# Patient Record
Sex: MALE | Race: WHITE | NOT HISPANIC OR LATINO | URBAN - METROPOLITAN AREA
[De-identification: names, ages, dates, MRNs, and addresses within clinical notes are randomized per-mention and may not be internally consistent; named-entity substitution may affect disease eponyms.]

---

## 2019-08-10 ENCOUNTER — HOSPITAL ENCOUNTER (OUTPATIENT)
Dept: URGENT CARE | Facility: CLINIC | Age: 29
Discharge: HOME | End: 2019-08-10
Attending: NURSE PRACTITIONER

## 2020-02-22 ENCOUNTER — HOSPITAL ENCOUNTER (OUTPATIENT)
Dept: URGENT CARE | Facility: CLINIC | Age: 30
Discharge: HOME | End: 2020-02-22

## 2020-02-24 LAB — BACTERIA SPEC AEROBE CULT: NORMAL

## 2025-02-06 ENCOUNTER — HOSPITAL ENCOUNTER (EMERGENCY)
Facility: HOSPITAL | Age: 35
Discharge: HOME | End: 2025-02-07
Attending: EMERGENCY MEDICINE
Payer: COMMERCIAL

## 2025-02-06 ENCOUNTER — APPOINTMENT (OUTPATIENT)
Dept: GENERAL RADIOLOGY | Facility: HOSPITAL | Age: 35
End: 2025-02-06
Payer: COMMERCIAL

## 2025-02-06 DIAGNOSIS — J10.1 INFLUENZA A: Primary | ICD-10-CM

## 2025-02-06 DIAGNOSIS — J18.9 PNEUMONIA OF BOTH LUNGS DUE TO INFECTIOUS ORGANISM, UNSPECIFIED PART OF LUNG: ICD-10-CM

## 2025-02-06 LAB
FLUAV SUBTYP SPEC NAA+PROBE: DETECTED
FLUBV RNA ISLT QL NAA+PROBE: NOT DETECTED
RSV RNA NPH QL NAA+NON-PROBE: NOT DETECTED
SARS-COV-2 RNA RESP QL NAA+PROBE: NOT DETECTED

## 2025-02-06 PROCEDURE — 99283 EMERGENCY DEPT VISIT LOW MDM: CPT

## 2025-02-06 PROCEDURE — 87637 SARSCOV2&INF A&B&RSV AMP PRB: CPT | Performed by: EMERGENCY MEDICINE

## 2025-02-06 PROCEDURE — 71045 X-RAY EXAM CHEST 1 VIEW: CPT

## 2025-02-06 RX ORDER — ACETAMINOPHEN 500 MG
1000 TABLET ORAL ONCE
Status: COMPLETED | OUTPATIENT
Start: 2025-02-06 | End: 2025-02-06

## 2025-02-06 RX ORDER — IBUPROFEN 600 MG/1
600 TABLET, FILM COATED ORAL ONCE
Status: COMPLETED | OUTPATIENT
Start: 2025-02-06 | End: 2025-02-06

## 2025-02-06 RX ADMIN — ACETAMINOPHEN 1000 MG: 500 TABLET ORAL at 23:31

## 2025-02-06 RX ADMIN — IBUPROFEN 600 MG: 600 TABLET, FILM COATED ORAL at 23:31

## 2025-02-06 NOTE — Clinical Note
[facility] EMERGENCY ROOM  [address] [place] [address]  Phone: [phone]  Fax: [phone]    Nicolas Denton was seen and treated in our emergency department on 2/6/2025.  He may return to work on 02/11/2025.         Thank you for choosing [facility].    Hillary Mayen PA-C

## 2025-02-06 NOTE — Clinical Note
[facility] EMERGENCY ROOM  [address] [place] [address]  Phone: [phone]  Fax: [phone]    Nicolas Denton was seen and treated in our emergency department on 2/6/2025.  He may return to work on 02/11/2025.         Thank you for choosing [facility].    Hillary Mayen PA-C

## 2025-02-07 VITALS
WEIGHT: 315 LBS | HEIGHT: 74 IN | HEART RATE: 129 BPM | BODY MASS INDEX: 40.43 KG/M2 | OXYGEN SATURATION: 92 % | RESPIRATION RATE: 20 BRPM | DIASTOLIC BLOOD PRESSURE: 54 MMHG | SYSTOLIC BLOOD PRESSURE: 138 MMHG | TEMPERATURE: 101.3 F

## 2025-02-07 RX ORDER — AZITHROMYCIN 250 MG/1
TABLET, FILM COATED ORAL
Qty: 6 TABLET | Refills: 0 | Status: SHIPPED | OUTPATIENT
Start: 2025-02-07

## 2025-02-07 NOTE — ED PROVIDER NOTES
"Time: 11:25 PM EST  Date of encounter:  2/6/2025  Independent Historian/Clinical History and Information was obtained by:   Patient and Family    History is limited by: N/A    Chief Complaint   Patient presents with    Fever    Cough         History of Present Illness:  Patient is a 35 y.o. year old male who presents to the emergency department for evaluation of fever, cough, headache, myalgias, rhinorrhea that started last night.  Wife has been giving him TheraFlu and [employer] cough medicine.  Last dose ibuprofen was earlier today.  Denies recent travel or exposure to illness.  States cough is nonproductive.  Recently quit smoking.    Patient Care Team  Primary Care Provider: Provider, No Known    Past Medical History:     No Known Allergies  No past medical history on file.  No past surgical history on file.  No family history on file.    Home Medications:  Prior to Admission medications    Not on File        Social History:          Review of Systems:  Review of Systems   Constitutional:  Positive for chills and fever.   HENT:  Positive for congestion and rhinorrhea.    Respiratory:  Positive for cough.    Musculoskeletal:  Positive for myalgias.   Neurological:  Positive for headaches.        Physical Exam:  /54 (BP Location: Left arm, Patient Position: Sitting)   Pulse (!) 129   Temp (!) 101.3 °F (38.5 °C) (Oral)   Resp 20   Ht 188 cm (74\")   Wt (!) 155 kg (341 lb 4.4 oz)   SpO2 92%   BMI 43.82 kg/m²         Physical Exam  Vitals and nursing note reviewed.   Constitutional:       Appearance: Normal appearance. He is obese.   HENT:      Head: Normocephalic and atraumatic.      Nose: Nose normal.      Mouth/Throat:      Mouth: Mucous membranes are moist.   Eyes:      Extraocular Movements: Extraocular movements intact.      Conjunctiva/sclera: Conjunctivae normal.      Pupils: Pupils are equal, round, and reactive to light.   Cardiovascular:      Rate and Rhythm: Normal rate and regular rhythm.      " Heart sounds: Normal heart sounds.   Pulmonary:      Effort: Pulmonary effort is normal.      Breath sounds: Normal breath sounds.   Musculoskeletal:         General: Normal range of motion.      Cervical back: Normal range of motion and neck supple.   Skin:     General: Skin is warm and dry.   Neurological:      General: No focal deficit present.      Mental Status: He is alert and oriented to person, place, and time.   Psychiatric:         Mood and Affect: Mood normal.         Behavior: Behavior normal.                        Medical Decision Making:      Comorbidities that affect care:    None    External Notes reviewed:    None      The following orders were placed and all results were independently analyzed by me:  Orders Placed This Encounter   Procedures    COVID-19, FLU A/B, RSV PCR 1 HR TAT - Swab, Nasopharynx    XR Chest 1 View    NPO Diet NPO Type: Sips with Meds    Verify & Document Antipyretic Administration    Reassess & Document Temperature 30-60 Minutes After Antipyretic Administration       Medications Given in the Emergency Department:  Medications   ibuprofen (ADVIL,MOTRIN) tablet 600 mg (600 mg Oral Given 2/6/25 2331)   acetaminophen (TYLENOL) tablet 1,000 mg (1,000 mg Oral Given 2/6/25 2331)        ED Course:    The patient was initially evaluated in the triage area where orders were placed. The patient was later dispositioned by Hillary Mayen PA-C.      The patient was advised to stay for completion of workup which includes but is not limited to communication of labs and radiological results, reassessment and plan. The patient was advised that leaving prior to disposition by a provider could result in critical findings that are not communicated to the patient.     ED Course as of 02/07/25 0006   Fri Feb 07, 2025   0005 Chest x-ray shows patchy infiltrates that could reflect pneumonia.  Patient does not have a PCP to follow-up with.  I will prescribe her a round of azithromycin. [AJ]       ED Course User Index  [AJ] Hillary Mayen PA-C       Labs:    Lab Results (last 24 hours)       Procedure Component Value Units Date/Time    COVID-19, FLU A/B, RSV PCR 1 HR TAT - Swab, Nasopharynx [[id number]]  (Abnormal) Collected: 02/06/25 2242    Specimen: Swab from Nasopharynx Updated: 02/06/25 2349     COVID19 Not Detected     Influenza A PCR Detected     Influenza B PCR Not Detected     RSV, PCR Not Detected    Narrative:      Fact sheet for providers: https://www.fda.gov/media/220065/download    Fact sheet for patients: https://www.fda.gov/media/624597/download    Test performed by PCR.             Imaging:    XR Chest 1 View    Result Date: 2/6/2025  XR CHEST 1 VW Date of Exam: 2/6/2025 11:42 PM EST Indication: cough/fever Comparison: None available. Findings: Cardiac and mediastinal contours are normal. Pulmonary vascularity is normal. No pneumothorax. There are some patchy infiltrates at the right base and in the left mid to lower lung that could reflect pneumonia. Granulomatous calcifications are present.     Patchy infiltrates at the right base and in the left mid to lower lung that could reflect pneumonia. Electronically Signed: Blaise Harris MD  2/6/2025 11:55 PM EST  Workstation ID: [id number]       Differential Diagnosis and Discussion:      Viral syndrome: Differential diagnosis includes but is not limited to influenza, common cold, COVID-19, RSV, adenovirus, enteroviruses, herpes virus, hepatitis virus, measles, mumps, rubella, dengue fever, and possible bacterial infection.    PROCEDURES:    Labs were collected in the emergency department and all labs were reviewed and interpreted by me.  X-ray were performed in the emergency department and all X-ray impressions were independently interpreted by me.    No orders to display        Procedures    MDM                   Patient Care Considerations:    ANTIBIOTICS: I considered prescribing antibiotics as an outpatient however no bacterial focus  of infection was found.      Consultants/Shared Management Plan:    None    Social Determinants of Health:    Patient is independent, reliable, and has access to care.       Disposition and Care Coordination:    Discharged: The patient is suitable and stable for discharge with no need for consideration of admission.    I have explained the patient´s condition, diagnoses and treatment plan based on the information available to me at this time. I have answered questions and addressed any concerns. The patient has a good  understanding of the patient´s diagnosis, condition, and treatment plan as can be expected at this point. The vital signs have been stable. The patient´s condition is stable and appropriate for discharge from the emergency department.      The patient will pursue further outpatient evaluation with the primary care physician or other designated or consulting physician as outlined in the discharge instructions. They are agreeable to this plan of care and follow-up instructions have been explained in detail. The patient has received these instructions in written format and has expressed an understanding of the discharge instructions. The patient is aware that any significant change in condition or worsening of symptoms should prompt an immediate return to this or the closest emergency department or call to 911.    Final diagnoses:   Influenza A   Pneumonia of both lungs due to infectious organism, unspecified part of lung        ED Disposition       ED Disposition   Discharge    Condition   Stable    Comment   --               This medical record created using voice recognition software.             Hillary Mayen PA-C  02/07/25 0006

## 2025-02-07 NOTE — DISCHARGE INSTRUCTIONS
You tested positive for influenza A  Your chest x-ray showed some patchy infiltrates that could reflect pneumonia.  And most likely is viral but due to not having a PCP to follow-up with I have sent a Z-Mike to the pharmacy.  Drink plenty of fluids, rest, take OTC tylenol/ibuprofen as needed for headache/body aches/fever.